# Patient Record
Sex: FEMALE | Race: OTHER | HISPANIC OR LATINO
[De-identification: names, ages, dates, MRNs, and addresses within clinical notes are randomized per-mention and may not be internally consistent; named-entity substitution may affect disease eponyms.]

---

## 2017-05-24 ENCOUNTER — APPOINTMENT (OUTPATIENT)
Dept: VASCULAR SURGERY | Facility: CLINIC | Age: 32
End: 2017-05-24

## 2017-05-24 VITALS — HEIGHT: 66 IN | BODY MASS INDEX: 20.89 KG/M2 | WEIGHT: 130 LBS

## 2017-05-24 VITALS — DIASTOLIC BLOOD PRESSURE: 72 MMHG | TEMPERATURE: 98 F | HEART RATE: 70 BPM | SYSTOLIC BLOOD PRESSURE: 112 MMHG

## 2017-05-24 DIAGNOSIS — I83.91 ASYMPTOMATIC VARICOSE VEINS OF RIGHT LOWER EXTREMITY: ICD-10-CM

## 2017-05-24 DIAGNOSIS — Z87.09 PERSONAL HISTORY OF OTHER DISEASES OF THE RESPIRATORY SYSTEM: ICD-10-CM

## 2017-05-24 DIAGNOSIS — N76.0 ACUTE VAGINITIS: ICD-10-CM

## 2017-05-24 DIAGNOSIS — Z80.8 FAMILY HISTORY OF MALIGNANT NEOPLASM OF OTHER ORGANS OR SYSTEMS: ICD-10-CM

## 2017-05-24 RX ORDER — ESCITALOPRAM OXALATE 10 MG/1
10 TABLET ORAL
Qty: 30 | Refills: 0 | Status: ACTIVE | COMMUNITY
Start: 2017-05-01

## 2017-05-24 RX ORDER — CLONAZEPAM 2 MG/1
2 TABLET ORAL
Qty: 30 | Refills: 0 | Status: ACTIVE | COMMUNITY
Start: 2017-05-01

## 2017-05-24 RX ORDER — ESCITALOPRAM OXALATE 5 MG/1
5 TABLET ORAL
Qty: 30 | Refills: 0 | Status: ACTIVE | COMMUNITY
Start: 2017-02-17

## 2017-05-24 RX ORDER — NYSTATIN AND TRIAMCINOLONE ACETONIDE 100000; 1 MG/G; MG/G
100000-0.1 CREAM TOPICAL
Qty: 30 | Refills: 0 | Status: ACTIVE | COMMUNITY
Start: 2017-05-22

## 2018-05-24 ENCOUNTER — APPOINTMENT (OUTPATIENT)
Dept: MAMMOGRAPHY | Facility: CLINIC | Age: 33
End: 2018-05-24
Payer: COMMERCIAL

## 2018-05-24 ENCOUNTER — OUTPATIENT (OUTPATIENT)
Dept: OUTPATIENT SERVICES | Facility: HOSPITAL | Age: 33
LOS: 1 days | End: 2018-05-24
Payer: COMMERCIAL

## 2018-05-24 ENCOUNTER — APPOINTMENT (OUTPATIENT)
Dept: ULTRASOUND IMAGING | Facility: CLINIC | Age: 33
End: 2018-05-24
Payer: COMMERCIAL

## 2018-05-24 DIAGNOSIS — Z00.8 ENCOUNTER FOR OTHER GENERAL EXAMINATION: ICD-10-CM

## 2018-05-24 PROCEDURE — 76641 ULTRASOUND BREAST COMPLETE: CPT | Mod: 26,50

## 2018-05-24 PROCEDURE — 76641 ULTRASOUND BREAST COMPLETE: CPT

## 2019-02-05 ENCOUNTER — OUTPATIENT (OUTPATIENT)
Dept: OUTPATIENT SERVICES | Facility: HOSPITAL | Age: 34
LOS: 1 days | Discharge: STILL ADMITTED OR ADMINISTRATIVE | End: 2019-02-05
Payer: MEDICAID

## 2019-02-06 DIAGNOSIS — O99.340 OTHER MENTAL DISORDERS COMPLICATING PREGNANCY, UNSPECIFIED TRIMESTER: ICD-10-CM

## 2019-05-24 PROCEDURE — 99214 OFFICE O/P EST MOD 30 MIN: CPT

## 2019-06-13 PROCEDURE — 99214 OFFICE O/P EST MOD 30 MIN: CPT

## 2019-08-24 ENCOUNTER — TRANSCRIPTION ENCOUNTER (OUTPATIENT)
Age: 34
End: 2019-08-24

## 2019-10-01 PROCEDURE — 99214 OFFICE O/P EST MOD 30 MIN: CPT

## 2019-12-10 PROCEDURE — 99214 OFFICE O/P EST MOD 30 MIN: CPT

## 2020-03-13 PROCEDURE — 99214 OFFICE O/P EST MOD 30 MIN: CPT

## 2020-05-05 PROCEDURE — 99442: CPT

## 2020-07-07 PROCEDURE — ZZZZZ: CPT

## 2020-08-04 PROCEDURE — ZZZZZ: CPT

## 2020-10-12 PROCEDURE — ZZZZZ: CPT

## 2020-12-07 PROCEDURE — ZZZZZ: CPT

## 2021-03-12 PROCEDURE — 99214 OFFICE O/P EST MOD 30 MIN: CPT | Mod: 95

## 2021-05-11 PROCEDURE — ZZZZZ: CPT

## 2021-08-05 PROCEDURE — 99214 OFFICE O/P EST MOD 30 MIN: CPT | Mod: 95

## 2022-02-10 ENCOUNTER — EMERGENCY (EMERGENCY)
Facility: HOSPITAL | Age: 37
LOS: 1 days | Discharge: HOME | End: 2022-02-10
Admitting: EMERGENCY MEDICINE
Payer: COMMERCIAL

## 2022-02-10 VITALS
DIASTOLIC BLOOD PRESSURE: 91 MMHG | RESPIRATION RATE: 17 BRPM | OXYGEN SATURATION: 100 % | TEMPERATURE: 97 F | SYSTOLIC BLOOD PRESSURE: 155 MMHG | HEART RATE: 66 BPM

## 2022-02-10 PROCEDURE — 99283 EMERGENCY DEPT VISIT LOW MDM: CPT

## 2022-02-10 NOTE — ED ADULT TRIAGE NOTE - CHIEF COMPLAINT QUOTE
intermittent b/l shoulder pain radiating to arms and chest for 2 days and states hard  to breathe due to pain. Hx Anxiety, Panic Attacks, Asthma

## 2022-02-11 VITALS
HEART RATE: 72 BPM | SYSTOLIC BLOOD PRESSURE: 148 MMHG | RESPIRATION RATE: 17 BRPM | DIASTOLIC BLOOD PRESSURE: 84 MMHG | TEMPERATURE: 97 F | OXYGEN SATURATION: 100 %

## 2022-02-11 RX ORDER — ACETAMINOPHEN 500 MG
975 TABLET ORAL ONCE
Refills: 0 | Status: COMPLETED | OUTPATIENT
Start: 2022-02-11 | End: 2022-02-11

## 2022-02-11 RX ORDER — CYCLOBENZAPRINE HYDROCHLORIDE 10 MG/1
10 TABLET, FILM COATED ORAL ONCE
Refills: 0 | Status: COMPLETED | OUTPATIENT
Start: 2022-02-11 | End: 2022-02-11

## 2022-02-11 RX ORDER — LIDOCAINE 4 G/100G
2 CREAM TOPICAL ONCE
Refills: 0 | Status: COMPLETED | OUTPATIENT
Start: 2022-02-11 | End: 2022-02-11

## 2022-02-11 RX ORDER — CYCLOBENZAPRINE HYDROCHLORIDE 10 MG/1
1 TABLET, FILM COATED ORAL
Qty: 15 | Refills: 0
Start: 2022-02-11 | End: 2022-02-15

## 2022-02-11 RX ADMIN — Medication 975 MILLIGRAM(S): at 00:20

## 2022-02-11 RX ADMIN — CYCLOBENZAPRINE HYDROCHLORIDE 10 MILLIGRAM(S): 10 TABLET, FILM COATED ORAL at 00:20

## 2022-02-11 RX ADMIN — LIDOCAINE 2 PATCH: 4 CREAM TOPICAL at 00:20

## 2022-02-11 NOTE — ED PROVIDER NOTE - PHYSICAL EXAMINATION
Vital signs reviewed.   CONSTITUTIONAL: Well-appearing; well-nourished; in no apparent distress. Non-toxic appearing.   HEAD: Normocephalic, atraumatic.  EYES: Normal conjunctiva and no sclera injection noted  ENT: normal nose; no rhinorrhea  CARD: Normal S1, S2  RESP: Normal chest excursion with respiration; breath sounds clear and equal bilaterally  EXT/MS: moves all extremities; distal pulses are normal, no pedal edema. Minimal tenderness on exam  SKIN: Normal for age and race; warm; dry; good turgor; no apparent lesions or exudate noted.  NEURO: Awake, alert, oriented x 3  PSYCH: Normal mood; appropriate affect.

## 2022-02-11 NOTE — ED PROVIDER NOTE - NSFOLLOWUPCLINICS_GEN_ALL_ED_FT
[facility] Rheumatology  Rheumatology  [address] [address]  Phone: [phone]  Fax:   Follow Up Time: Routine

## 2022-02-11 NOTE — ED PROVIDER NOTE - PATIENT PORTAL LINK FT
You can access the FollowMyHealth Patient Portal offered by [facility] by registering at the following website: [email or web address]. By joining [employer]’s FollowMyHealth portal, you will also be able to view your health information using other applications (apps) compatible with our system.

## 2022-02-11 NOTE — ED PROVIDER NOTE - NSFOLLOWUPINSTRUCTIONS_ED_ALL_ED_FT
1) Follow up with your primary care for further evaluation  2) Return to the ED immediately for new or worsening symptoms including headache, nausea/vomiting, dizziness or chest pain  3) Please continue to take any home medications as prescribed. Take Tylenol 325 mg every 4 hours for pain relief/fever control or ibuprofen 600 mg every 6 hours for pain relief/fever control  4) Your test results from your ED visit were discussed with you prior to discharge  5) You were provided with a copy of your test results

## 2022-02-11 NOTE — ED PROVIDER NOTE - OBJECTIVE STATEMENT
37 Y/O F w/ PMH of asthma presents to ER for atraumatic shoulder pain. Admits to onset of pain a few days ago. Experiencing throbbing at rest and w/ movement. No alleviating factors. Reports worsening symptoms radiating to distal upper extremities prompting visit today. No accident, injury or fall. Taking care of young child at home. Denies fever, nausea/vomiting, dizziness, headache, numbness/tingling

## 2022-02-11 NOTE — ED PROVIDER NOTE - CLINICAL SUMMARY MEDICAL DECISION MAKING FREE TEXT BOX
37 Y/O F w/ PMH of asthma presents to ER for atraumatic shoulder pain.   PT reports improvement of symptom following medication  RX medications. Rheum referral  Return precautions

## 2022-02-11 NOTE — ED PROVIDER NOTE - NS ED ROS FT
Constitutional: (-) fever  Head: Normal cephalic, Atraumatic  Cardiovascular: (-) chest pain, (-) wheezing  Respiratory: (-) cough, (-) shortness of breath  Gastrointestinal: (-) vomiting, (-) diarrhea, (-) abdominal pain  : (-) dysuria   Musculoskeletal: (-) back pain (+) shoulder pain BL  Integumentary: (-) rash, (-) edema  Neurological: (-)loc  Allergic/Immunologic: (-) pruritus

## 2022-02-11 NOTE — ED ADULT NURSE NOTE - OBJECTIVE STATEMENT
Pt received to INT spot 10B. Pt A&OX4, ambulatory at baseline. Denies pmh. Pt c/o bilateral deltoid pain, elbow joint pain, and wrist pain occurring for 2 days. Pt states pain occurs with activity and rest and denies taking anything to relieve the pain. Pt denies any recent falls, trauma or injury. Pt resp even unlabored, abd soft nontender, pedal pulses 2+ bilaterally. Pt denies chest pain, SOB, n/v/d, headache, dizziness, numbness/tingling to hands/feet. Pt vitally stable. Medicated as per MD. Awaiting further orders.

## 2022-02-11 NOTE — ED ADULT NURSE NOTE - TEMPLATE LIST FOR HEAD TO TOE ASSESSMENT
You are to see an ENT for examination of your ear  You may have what could be an acoustic neuroma - it could be nothing as well  Take tylenol or motrin as needed for pain  General

## 2022-02-11 NOTE — ED ADULT NURSE NOTE - NSIMPLEMENTINTERV_GEN_ALL_ED
Implemented All Fall with Harm Risk Interventions:  [place] to call system. Call bell, personal items and telephone within reach. Instruct patient to call for assistance. Room bathroom lighting operational. Non-slip footwear when patient is off stretcher. Physically safe environment: no spills, clutter or unnecessary equipment. Stretcher in lowest position, wheels locked, appropriate side rails in place. Provide visual cue, wrist band, yellow gown, etc. Monitor gait and stability. Monitor for mental status changes and reorient to person, place, and time. Review medications for side effects contributing to fall risk. Reinforce activity limits and safety measures with patient and family. Provide visual clues: red socks.

## 2022-03-28 NOTE — ED PROVIDER NOTE - NS_EDPROVIDERDISPOUSERTYPE_ED_A_ED
Detail Level: Detailed I have personally evaluated and examined the patient. The Attending was available to me as a supervising provider if needed.

## 2022-04-05 ENCOUNTER — APPOINTMENT (OUTPATIENT)
Dept: RHEUMATOLOGY | Facility: CLINIC | Age: 37
End: 2022-04-05

## 2023-07-11 ENCOUNTER — EMERGENCY (EMERGENCY)
Facility: HOSPITAL | Age: 38
LOS: 1 days | Discharge: HOME | End: 2023-07-11
Attending: EMERGENCY MEDICINE | Admitting: EMERGENCY MEDICINE
Payer: MEDICAID

## 2023-07-11 VITALS
TEMPERATURE: 98 F | OXYGEN SATURATION: 100 % | DIASTOLIC BLOOD PRESSURE: 90 MMHG | RESPIRATION RATE: 17 BRPM | SYSTOLIC BLOOD PRESSURE: 156 MMHG | HEART RATE: 82 BPM

## 2023-07-11 PROCEDURE — 93010 ELECTROCARDIOGRAM REPORT: CPT

## 2023-07-11 PROCEDURE — 99284 EMERGENCY DEPT VISIT MOD MDM: CPT

## 2023-07-11 RX ORDER — KETOROLAC TROMETHAMINE 30 MG/ML
15 SYRINGE (ML) INJECTION ONCE
Refills: 0 | Status: DISCONTINUED | OUTPATIENT
Start: 2023-07-11 | End: 2023-07-11

## 2023-07-11 RX ORDER — CYCLOBENZAPRINE HYDROCHLORIDE 10 MG/1
10 TABLET, FILM COATED ORAL ONCE
Refills: 0 | Status: COMPLETED | OUTPATIENT
Start: 2023-07-11 | End: 2023-07-11

## 2023-07-11 NOTE — ED PROVIDER NOTE - PROGRESS NOTE DETAILS
Patient stated that she no longer wanted to wait for any laboratory studies or x-ray.  States she has to get home to take care of family.  Patient has capacity understands risks and benefits of doing so.  Will discharge at this time

## 2023-07-11 NOTE — ED PROVIDER NOTE - OBJECTIVE STATEMENT
37-year-old female past medical history of asthma presenting with a few hours of left arm pain.  Stated it woke her from sleep and is severe.  Worse with movement.  There is no chest pain contrary to the triage note.  It is nonexertional and there is no shortness of breath.  No previous pain like this in the past.  States she does exercise daily and do heavy lifting.  No paresthesias or weakness.  No family history of early cardiac disease.  Non-smoker

## 2023-07-11 NOTE — ED ADULT NURSE REASSESSMENT NOTE - NS ED NURSE REASSESS COMMENT FT1
patient reports she has to leave to take care of her child. refused blood work and medication. MD nguyễn

## 2023-07-11 NOTE — ED PROVIDER NOTE - PHYSICAL EXAMINATION
Vitals: I have reviewed the patients vital signs  General: nontoxic appearing  HEENT: Atraumatic, normocephalic, airway patent  Eyes: EOMI, tracking appropriately  Neck: no tracheal deviation  Chest/Lungs: no trauma, symmetric chest rise, speaking in complete sentences,  no resp distressClear to auscultation bilaterally  Heart: skin and extremities well perfused, regular rate and rhythm  Neuro: A+Ox3, appears non focal  MSK: no deformities  Skin: no cyanosis, no jaundice   Psych:  Normal mood and affect

## 2023-07-11 NOTE — ED PROVIDER NOTE - PATIENT PORTAL LINK FT
You can access the FollowMyHealth Patient Portal offered by [facility] by registering at the following website: [email or web address]. By joining [employer]’s FollowMyHealth portal, you will also be able to view your health information using other applications (apps) compatible with our system.

## 2023-07-11 NOTE — ED PROVIDER NOTE - CLINICAL SUMMARY MEDICAL DECISION MAKING FREE TEXT BOX
37-year-old female presents emerged from today with arm pain.  Contrary to the triage note there was no chest pain.  The differential includes but is not limited to radiculopathy, musculoskeletal strain, atypical ACS.  EKG is reassuring however would need troponin to be complete and work-up.  We will also get chest x-ray while treating symptomatically.  If work-up negative patient appropriate for outpatient management

## 2023-07-11 NOTE — ED PROVIDER NOTE - NSFOLLOWUPINSTRUCTIONS_ED_ALL_ED_FT
Musculoskeletal Pain  Musculoskeletal pain refers to aches and pains in your bones, joints, muscles, and the tissues that surround them. This pain can occur in any part of the body. It can last for a short time (acute) or a long time (chronic).    A physical exam, lab tests, and imaging studies may be done to find the cause of your musculoskeletal pain.    Follow these instructions at home:  Lifestyle    Try to control or lower your stress levels. Stress increases muscle tension and can worsen musculoskeletal pain. It is important to recognize when you are anxious or stressed and learn ways to manage it. This may include:  Meditation or yoga.  Cognitive or behavioral therapy.  Acupuncture or massage therapy.  You may continue all activities unless the activities cause more pain. When the pain gets better, slowly resume your normal activities. Gradually increase the intensity and duration of your activities or exercise.  Managing pain, stiffness, and swelling        Treatment may include medicines for pain and inflammation that are taken by mouth or applied to the skin. Take over-the-counter and prescription medicines only as told by your health care provider.  When your pain is severe, bed rest may be helpful. Lie or sit in any position that is comfortable, but get out of bed and walk around at least every couple of hours.  If directed, apply heat to the affected area as often as told by your health care provider. Use the heat source that your health care provider recommends, such as a moist heat pack or a heating pad.  Place a towel between your skin and the heat source.  Leave the heat on for 20–30 minutes.  Remove the heat if your skin turns bright red. This is especially important if you are unable to feel pain, heat, or cold. You may have a greater risk of getting burned.  If directed, put ice on the painful area. To do this:  Put ice in a plastic bag.  Place a towel between your skin and the bag.  Leave the ice on for 20 minutes, 2–3 times a day.  Remove the ice if your skin turns bright red. This is very important. If you cannot feel pain, heat, or cold, you have a greater risk of damage to the area.  General instructions    Your health care provider may recommend that you see a physical therapist. This person can help you come up with a safe exercise program.  If told by your health care provider, do physical therapy exercises to improve movement and strength in the affected area.  Keep all follow-up visits. This is important. This includes any physical therapy visits.  Contact a health care provider if:  Your pain gets worse.  Medicines do not help ease your pain.  You cannot use the part of your body that hurts, such as your arm, leg, or neck.  You have trouble sleeping.  You have trouble doing your normal activities.  Get help right away if:  You have a new injury and your pain is worse or different.  You feel numb or you have tingling in the painful area.  Summary  Musculoskeletal pain refers to aches and pains in your bones, joints, muscles, and the tissues that surround them.  This pain can occur in any part of the body.  Your health care provider may recommend that you see a physical therapist. This person can help you come up with a safe exercise program. Do any exercises as told by your physical therapist.  Lower your stress level. Stress can worsen musculoskeletal pain. Ways to lower stress may include meditation, yoga, cognitive or behavioral therapy, acupuncture, and massage therapy.

## 2023-07-11 NOTE — ED ADULT TRIAGE NOTE - CHIEF COMPLAINT QUOTE
Pt c/o left sided chest and arm pain X 1 day. Denies sob, abd pain, n/v/d, fevers/chills. Hx: Asthma

## 2025-08-29 ENCOUNTER — APPOINTMENT (OUTPATIENT)
Dept: DERMATOLOGY | Facility: CLINIC | Age: 40
End: 2025-08-29